# Patient Record
Sex: MALE | Race: BLACK OR AFRICAN AMERICAN | NOT HISPANIC OR LATINO | Employment: UNEMPLOYED
[De-identification: names, ages, dates, MRNs, and addresses within clinical notes are randomized per-mention and may not be internally consistent; named-entity substitution may affect disease eponyms.]

---
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
## 2024-07-27 ENCOUNTER — HOSPITAL ENCOUNTER (EMERGENCY)
Facility: CLINIC | Age: 19
Discharge: HOME | End: 2024-07-27
Attending: EMERGENCY MEDICINE | Admitting: EMERGENCY MEDICINE

## 2024-07-27 ENCOUNTER — APPOINTMENT (OUTPATIENT)
Dept: GENERAL RADIOLOGY | Facility: CLINIC | Age: 19
End: 2024-07-27

## 2024-07-27 VITALS
WEIGHT: 95.9 LBS | RESPIRATION RATE: 20 BRPM | SYSTOLIC BLOOD PRESSURE: 111 MMHG | DIASTOLIC BLOOD PRESSURE: 67 MMHG | OXYGEN SATURATION: 98 % | TEMPERATURE: 98.6 F | HEART RATE: 86 BPM

## 2024-07-27 DIAGNOSIS — R07.89 ATYPICAL CHEST PAIN: ICD-10-CM

## 2024-07-27 DIAGNOSIS — R06.02 SHORTNESS OF BREATH: ICD-10-CM

## 2024-07-27 LAB
ANION GAP SERPL CALCULATED.3IONS-SCNC: 16 MMOL/L (ref 7–15)
BASOPHILS # BLD AUTO: 0 10E3/UL (ref 0–0.2)
BASOPHILS NFR BLD AUTO: 1 %
BUN SERPL-MCNC: 6.3 MG/DL (ref 6–20)
CALCIUM SERPL-MCNC: 9.9 MG/DL (ref 8.8–10.4)
CHLORIDE SERPL-SCNC: 102 MMOL/L (ref 98–107)
CREAT SERPL-MCNC: 0.85 MG/DL (ref 0.67–1.17)
D DIMER PPP FEU-MCNC: <0.27 UG/ML FEU (ref 0–0.5)
EGFRCR SERPLBLD CKD-EPI 2021: >90 ML/MIN/1.73M2
EOSINOPHIL # BLD AUTO: 1.4 10E3/UL (ref 0–0.7)
EOSINOPHIL NFR BLD AUTO: 15 %
ERYTHROCYTE [DISTWIDTH] IN BLOOD BY AUTOMATED COUNT: 12 % (ref 10–15)
FLUAV RNA SPEC QL NAA+PROBE: NEGATIVE
FLUBV RNA RESP QL NAA+PROBE: NEGATIVE
GLUCOSE SERPL-MCNC: 85 MG/DL (ref 70–99)
HCO3 SERPL-SCNC: 20 MMOL/L (ref 22–29)
HCT VFR BLD AUTO: 48.3 % (ref 40–53)
HGB BLD-MCNC: 16.4 G/DL (ref 13.3–17.7)
HOLD SPECIMEN: NORMAL
HOLD SPECIMEN: NORMAL
IMM GRANULOCYTES # BLD: 0 10E3/UL
IMM GRANULOCYTES NFR BLD: 0 %
LYMPHOCYTES # BLD AUTO: 2.9 10E3/UL (ref 0.8–5.3)
LYMPHOCYTES NFR BLD AUTO: 32 %
MCH RBC QN AUTO: 29.1 PG (ref 26.5–33)
MCHC RBC AUTO-ENTMCNC: 34 G/DL (ref 31.5–36.5)
MCV RBC AUTO: 86 FL (ref 78–100)
MONOCYTES # BLD AUTO: 0.5 10E3/UL (ref 0–1.3)
MONOCYTES NFR BLD AUTO: 6 %
NEUTROPHILS # BLD AUTO: 4.1 10E3/UL (ref 1.6–8.3)
NEUTROPHILS NFR BLD AUTO: 46 %
NRBC # BLD AUTO: 0 10E3/UL
NRBC BLD AUTO-RTO: 0 /100
PLATELET # BLD AUTO: 239 10E3/UL (ref 150–450)
POTASSIUM SERPL-SCNC: 3.7 MMOL/L (ref 3.4–5.3)
RBC # BLD AUTO: 5.63 10E6/UL (ref 4.4–5.9)
RSV RNA SPEC NAA+PROBE: NEGATIVE
SARS-COV-2 RNA RESP QL NAA+PROBE: NEGATIVE
SODIUM SERPL-SCNC: 138 MMOL/L (ref 135–145)
TROPONIN T SERPL HS-MCNC: <6 NG/L
WBC # BLD AUTO: 8.9 10E3/UL (ref 4–11)

## 2024-07-27 PROCEDURE — 85004 AUTOMATED DIFF WBC COUNT: CPT | Performed by: EMERGENCY MEDICINE

## 2024-07-27 PROCEDURE — 87637 SARSCOV2&INF A&B&RSV AMP PRB: CPT | Performed by: EMERGENCY MEDICINE

## 2024-07-27 PROCEDURE — 99285 EMERGENCY DEPT VISIT HI MDM: CPT | Mod: 25

## 2024-07-27 PROCEDURE — 96372 THER/PROPH/DIAG INJ SC/IM: CPT

## 2024-07-27 PROCEDURE — 36415 COLL VENOUS BLD VENIPUNCTURE: CPT | Performed by: EMERGENCY MEDICINE

## 2024-07-27 PROCEDURE — 71046 X-RAY EXAM CHEST 2 VIEWS: CPT

## 2024-07-27 PROCEDURE — 93005 ELECTROCARDIOGRAM TRACING: CPT

## 2024-07-27 PROCEDURE — 84484 ASSAY OF TROPONIN QUANT: CPT | Performed by: EMERGENCY MEDICINE

## 2024-07-27 PROCEDURE — 250N000009 HC RX 250

## 2024-07-27 PROCEDURE — 94640 AIRWAY INHALATION TREATMENT: CPT

## 2024-07-27 PROCEDURE — 85025 COMPLETE CBC W/AUTO DIFF WBC: CPT | Performed by: EMERGENCY MEDICINE

## 2024-07-27 PROCEDURE — 85379 FIBRIN DEGRADATION QUANT: CPT | Performed by: EMERGENCY MEDICINE

## 2024-07-27 PROCEDURE — 250N000011 HC RX IP 250 OP 636

## 2024-07-27 PROCEDURE — 82374 ASSAY BLOOD CARBON DIOXIDE: CPT | Performed by: EMERGENCY MEDICINE

## 2024-07-27 RX ORDER — ALBUTEROL SULFATE 0.83 MG/ML
2.5 SOLUTION RESPIRATORY (INHALATION)
Status: COMPLETED | OUTPATIENT
Start: 2024-07-27 | End: 2024-07-27

## 2024-07-27 RX ORDER — ALBUTEROL SULFATE 90 UG/1
2 AEROSOL, METERED RESPIRATORY (INHALATION) EVERY 6 HOURS PRN
Qty: 18 G | Refills: 0 | Status: SHIPPED | OUTPATIENT
Start: 2024-07-27

## 2024-07-27 RX ORDER — PREDNISONE 20 MG/1
TABLET ORAL
Qty: 10 TABLET | Refills: 0 | Status: SHIPPED | OUTPATIENT
Start: 2024-07-27

## 2024-07-27 RX ORDER — KETOROLAC TROMETHAMINE 15 MG/ML
10 INJECTION, SOLUTION INTRAMUSCULAR; INTRAVENOUS ONCE
Status: COMPLETED | OUTPATIENT
Start: 2024-07-27 | End: 2024-07-27

## 2024-07-27 RX ORDER — KETOROLAC TROMETHAMINE 15 MG/ML
10 INJECTION, SOLUTION INTRAMUSCULAR; INTRAVENOUS ONCE
Status: DISCONTINUED | OUTPATIENT
Start: 2024-07-27 | End: 2024-07-27

## 2024-07-27 RX ORDER — IPRATROPIUM BROMIDE AND ALBUTEROL SULFATE 2.5; .5 MG/3ML; MG/3ML
1 SOLUTION RESPIRATORY (INHALATION) EVERY 6 HOURS PRN
Qty: 90 ML | Refills: 0 | Status: SHIPPED | OUTPATIENT
Start: 2024-07-27

## 2024-07-27 RX ADMIN — KETOROLAC TROMETHAMINE 10 MG: 15 INJECTION, SOLUTION INTRAMUSCULAR; INTRAVENOUS at 18:36

## 2024-07-27 RX ADMIN — ALBUTEROL SULFATE 2.5 MG: 2.5 SOLUTION RESPIRATORY (INHALATION) at 18:35

## 2024-07-27 ASSESSMENT — COLUMBIA-SUICIDE SEVERITY RATING SCALE - C-SSRS
6. HAVE YOU EVER DONE ANYTHING, STARTED TO DO ANYTHING, OR PREPARED TO DO ANYTHING TO END YOUR LIFE?: NO
1. IN THE PAST MONTH, HAVE YOU WISHED YOU WERE DEAD OR WISHED YOU COULD GO TO SLEEP AND NOT WAKE UP?: NO
2. HAVE YOU ACTUALLY HAD ANY THOUGHTS OF KILLING YOURSELF IN THE PAST MONTH?: NO

## 2024-07-27 ASSESSMENT — ACTIVITIES OF DAILY LIVING (ADL)
ADLS_ACUITY_SCORE: 33
ADLS_ACUITY_SCORE: 35

## 2024-07-27 NOTE — ED PROVIDER NOTES
Emergency Department Note      History of Present Illness     Chief Complaint   Shortness of Breath      HPI   Zarina Bowling is a 18 year old male with a past medical history of asthma who presents to the emergency department today for evaluation of chest pain and shortness of breath.  Patient reports that he woke up this morning with centralized chest pain rated 10/10 in severity.  It radiates to his right shoulder.  It is pleuritic in nature.  States that he is never felt pain like this before.  Does report that he has been having an ongoing cough for about 2 months now.  Has tried Mucinex without relief.  Does have a history of asthma however he ran out of his inhalers in November.  Does report recent travel from [place] 2 days ago.  Denies fever, chills, nausea, vomiting, abdominal pain.    Independent Historian   None    Review of External Notes   None    Past Medical History     Medical History and Problem List   Past Medical History:   Diagnosis Date    ROUTINE CHILD HEALTH EXAM        Medications   albuterol (PROAIR HFA/PROVENTIL HFA/VENTOLIN HFA) 108 (90 Base) MCG/ACT inhaler  ipratropium - albuterol 0.5 mg/2.5 mg/3 mL (DUONEB) 0.5-2.5 (3) MG/3ML neb solution  predniSONE (DELTASONE) 20 MG tablet  HYDROCORTISONE (TOPICAL) 1 % EX CREA  PEDIACARE INFANT DECON/COUGH 7.5-2.5 MG/0.8ML OR SOLN  TYLENOL INFANT DROPS SOLN 100 MG/ML OR        Surgical History   No past surgical history on file.    Physical Exam     Patient Vitals for the past 24 hrs:   BP Temp Pulse Resp SpO2 Weight   07/27/24 1728 -- -- -- -- -- 43.5 kg (95 lb 14.4 oz)   07/27/24 1727 111/67 -- -- -- -- --   07/27/24 1724 -- 98.6  F (37  C) 86 20 98 % --     Physical Exam  General: Awake, alert, non-toxic.  Head:  Scalp is atraumatic.   Eyes:  Conjunctiva normal, PERRL  ENT:  The external nose and ears are normal.     Oropharynx clear, uvula midline.  Neck:  Normal range of motion without rigidity.  CV:  Regular rate and rhythm    No pathologic  murmur, rubs, or gallops.  Resp:  Breath sounds are clear bilaterally    Non-labored, no retractions or accessory muscle use  Abdomen: Abdomen is soft, no distension, no tenderness, no masses. No CVA tenderness.  MS:  No lower extremity edema/swelling. No midline cervical, thoracic, or lumbar tenderness.  Extremities without joint swelling or redness.  Skin:  Warm and dry, No rash or lesions noted.  Neuro:  Alert and oriented.  GCS 15 Moves all extremities normal.  No facial asymmetry. Gait normal.  Psych: Awake. Alert. Normal affect. Appropriate interactions.    Diagnostics     Lab Results   Labs Ordered and Resulted from Time of ED Arrival to Time of ED Departure   BASIC METABOLIC PANEL - Abnormal       Result Value    Sodium 138      Potassium 3.7      Chloride 102      Carbon Dioxide (CO2) 20 (*)     Anion Gap 16 (*)     Urea Nitrogen 6.3      Creatinine 0.85      GFR Estimate >90      Calcium 9.9      Glucose 85     CBC WITH PLATELETS AND DIFFERENTIAL - Abnormal    WBC Count 8.9      RBC Count 5.63      Hemoglobin 16.4      Hematocrit 48.3      MCV 86      MCH 29.1      MCHC 34.0      RDW 12.0      Platelet Count 239      % Neutrophils 46      % Lymphocytes 32      % Monocytes 6      % Eosinophils 15      % Basophils 1      % Immature Granulocytes 0      NRBCs per 100 WBC 0      Absolute Neutrophils 4.1      Absolute Lymphocytes 2.9      Absolute Monocytes 0.5      Absolute Eosinophils 1.4 (*)     Absolute Basophils 0.0      Absolute Immature Granulocytes 0.0      Absolute NRBCs 0.0     TROPONIN T, HIGH SENSITIVITY - Normal    Troponin T, High Sensitivity <6     INFLUENZA A/B, RSV, & SARS-COV2 PCR - Normal    Influenza A PCR Negative      Influenza B PCR Negative      RSV PCR Negative      SARS CoV2 PCR Negative     D DIMER QUANTITATIVE - Normal    D-Dimer Quantitative <0.27         Imaging   XR Chest 2 Views   Final Result   IMPRESSION: Hyperaeration.  No focal consolidation  No pneumothorax or pleural  effusion.  Cardiomediastinal within normal limits.          ECG results from 07/27/24   EKG 12 lead     Value    Systolic Blood Pressure     Diastolic Blood Pressure     Ventricular Rate 58    Atrial Rate 58    MN Interval 120    QRS Duration 88        QTc 378    P Axis 38    R AXIS 87    T Axis 72    Interpretation ECG      Sinus bradycardia with sinus arrhythmia  Otherwise normal ECG  No previous ECGs available       Independent Interpretation   None    ED Course      Medications Administered   Medications   albuterol (PROVENTIL) neb solution 2.5 mg (2.5 mg Nebulization $Given 7/27/24 9825)   ketorolac (TORADOL) injection 10 mg (10 mg Intramuscular $Given 7/27/24 2969)       Procedures   Procedures     Discussion of Management   None    ED Course        Optional/Additional Documentation  None    Medical Decision Making / Diagnosis     CMS Diagnoses: None    MIPS       None    MDM   Zarina Bowling is a 18 year old male who presented to the emergency department today for evaluation chest pain and shortness of breath.  See HPI for further details.  Patient is afebrile and hemodynamically stable.  There is no wheezing on auscultation however due to history of asthma given albuterol nebulizer here. She due to the nature of the pleuritic chest pain and recent travel D-dimer was obtained which was thankfully negative.  Troponin negative.  EKG in normal sinus rhythm without acute ST/T wave changes. Low suspicion for ACS.  Chest x-ray without evidence of pneumonia, pneumothorax, pleural effusion.  No evidence patient felt improved with above treatments.  COVID/influenza negative. Patient at this time no longer has albuterol inhalers at home.  Patient patient discharged home with albuterol inhalers along with DuoNeb nebulizer solution.  RT teaching was performed.  Due to the length of the patient's cough for a prednisone burst was ordered.  All questions answered.  Patient discharged home in stable  condition.    Disposition   The patient was discharged.     Diagnosis     ICD-10-CM    1. Shortness of breath  R06.02 Nebulizer and Supplies Order      2. Atypical chest pain  R07.89            Discharge Medications   Discharge Medication List as of 7/27/2024  7:45 PM        START taking these medications    Details   albuterol (PROAIR HFA/PROVENTIL HFA/VENTOLIN HFA) 108 (90 Base) MCG/ACT inhaler Inhale 2 puffs into the lungs every 6 hours as needed for shortness of breath, wheezing or cough, Disp-18 g, R-0, Local Print[facility]armacy may dispense brand covered by insurance (Proair, or proventil or ventolin or generic albuterol inhaler)      ipratropium - albuterol 0.5 mg/2.5 mg/3 mL (DUONEB) 0.5-2.5 (3) MG/3ML neb solution Take 1 vial (3 mLs) by nebulization every 6 hours as needed for shortness of breath, wheezing or cough, Disp-90 mL, R-0, Local Print      predniSONE (DELTASONE) 20 MG tablet Take two tablets (= 40mg) each day for 5 (five) days, Disp-10 tablet, R-0, Local Print               ELVA Hess Alexandra, PA-C  07/28/24 0114

## 2024-07-27 NOTE — ED TRIAGE NOTES
Patient arrives with complaints of SOB and chest tightness for the last week. Hx of asthma. Does not have inhaler or nebulizer solutions. ABCs intact.

## 2024-07-28 NOTE — ED PROVIDER NOTES
ED APC SUPERVISION NOTE:   I evaluated this patient in conjunction with Elizabeth Brennan PA-C  I have participated in the care of the patient and personally performed key elements of the history, exam, and medical decision making.      HPI:   Zarina Bowling is a 18 year old male with a history of asthma who presents to the ED for evaluation of chest pain and shortness of breath. The patient reports that he woke up this morning with chest pain that radiated to his right shoulder. Alongside this, he has had a productive cough for the last 2 weeks, but he stopped coughing today. He denies having a fever, runny nose, abdominal pain, nausea, or vomiting. No family history early [place]. He flew recently from [place]. No history blood clots/drug use/sick contacts.      Independent Historian:   None    Review of External Notes: none      EXAM:   Nursing note and vitals reviewed.  Constitutional: Well nourished. Resting comfortably.   Eyes: Conjunctiva normal.  Pupils are equal, round, and reactive to light.   ENT: Nose normal. Mucous membranes pink and moist.    Neck: Normal range of motion.  CVS: Normal rate, regular rhythm.  Normal heart sounds.  No murmur.  Pulmonary: Lungs clear to auscultation bilaterally. No wheezes/rales/rhonchi.  GI: Abdomen soft. Nontender, nondistended. No rigidity or guarding.    MSK: No calf tenderness or swelling.  Neuro: Alert. Follows simple commands.  Skin: Skin is warm and dry. No rash noted.   Psychiatric: Normal affect.       Independent Interpretation (X-rays, CTs, rhythm strip):  CXR: no focal pneumonia    Consultations/Discussion of Management or Tests:  None     Social Determinants of Health affecting care:   None     MEDICAL DECISION MAKING/ASSESSMENT AND PLAN:   Patient is a 18-year-old male presenting with predominant complaints of chest pain and shortness of breath as well as recent cough.  He is nontoxic, in no significant distress on exam.  He was given albuterol prior to my  evaluation though reportedly had diminished lung sounds.  He reported significant symptom improvement after albuterol and Toradol.  EKG without focal ischemia, Brugada, WPW or prolonged QTc.  Delta troponin negative, his presentation would be atypical of ACS.  D-dimer negative, clinically doubt PE.  Chest x-ray without focal pneumonia, fluid overload, widened mediastinum or pneumothorax.  He has no abdominal tenderness and I doubt intra-abdominal catastrophe.  I did discuss with patient quite possible his chest pain is more pleuritic in nature and he is suffering from a mild asthma exacerbation.  Will continue albuterol on discharge as well as short course of prednisone given his reported recent cough.  COVID-19/influenza/RSV pending at time of discharge.  Nebulizer also provided for home.  Return precautions given.     DIAGNOSIS:     ICD-10-CM    1. Shortness of breath  R06.02 Nebulizer and Supplies Order      2. Atypical chest pain  R07.89                DISPOSITION:   Discharge     Naya Mason DO  7/27/2024  [facility] EMERGENCY DEPT     Naya Mason, DO  07/27/24 1953     192

## 2024-07-28 NOTE — DISCHARGE INSTRUCTIONS
Please make an appointment to follow up with you primary care provider.     Use albuterol nebulizer's as needed for shortness of breath and wheezing.    Please take prednisone 40 mg for the next 5 days to help with cough and inflammation.    Return to the emergency department if you develop fever, worsening shortness of breath, worsening chest pain.

## 2024-07-29 LAB
ATRIAL RATE - MUSE: 58 BPM
DIASTOLIC BLOOD PRESSURE - MUSE: NORMAL MMHG
INTERPRETATION ECG - MUSE: NORMAL
P AXIS - MUSE: 38 DEGREES
PR INTERVAL - MUSE: 120 MS
QRS DURATION - MUSE: 88 MS
QT - MUSE: 386 MS
QTC - MUSE: 378 MS
R AXIS - MUSE: 87 DEGREES
SYSTOLIC BLOOD PRESSURE - MUSE: NORMAL MMHG
T AXIS - MUSE: 72 DEGREES
VENTRICULAR RATE- MUSE: 58 BPM

## 2024-11-11 ENCOUNTER — HOSPITAL ENCOUNTER (EMERGENCY)
Facility: CLINIC | Age: 19
Discharge: HOME | End: 2024-11-11
Attending: STUDENT IN AN ORGANIZED HEALTH CARE EDUCATION/TRAINING PROGRAM | Admitting: STUDENT IN AN ORGANIZED HEALTH CARE EDUCATION/TRAINING PROGRAM

## 2024-11-11 VITALS
SYSTOLIC BLOOD PRESSURE: 119 MMHG | HEIGHT: 67 IN | OXYGEN SATURATION: 99 % | HEART RATE: 68 BPM | TEMPERATURE: 99.9 F | RESPIRATION RATE: 16 BRPM | WEIGHT: 110 LBS | BODY MASS INDEX: 17.27 KG/M2 | DIASTOLIC BLOOD PRESSURE: 83 MMHG

## 2024-11-11 DIAGNOSIS — H61.22 IMPACTED CERUMEN OF LEFT EAR: ICD-10-CM

## 2024-11-11 DIAGNOSIS — J06.9 URI (UPPER RESPIRATORY INFECTION): ICD-10-CM

## 2024-11-11 LAB
FLUAV RNA SPEC QL NAA+PROBE: NEGATIVE
FLUBV RNA RESP QL NAA+PROBE: NEGATIVE
GROUP A STREP BY PCR: NOT DETECTED
RSV RNA SPEC NAA+PROBE: NEGATIVE
SARS-COV-2 RNA RESP QL NAA+PROBE: NEGATIVE

## 2024-11-11 PROCEDURE — 87637 SARSCOV2&INF A&B&RSV AMP PRB: CPT | Performed by: STUDENT IN AN ORGANIZED HEALTH CARE EDUCATION/TRAINING PROGRAM

## 2024-11-11 PROCEDURE — 69209 REMOVE IMPACTED EAR WAX UNI: CPT | Mod: LT

## 2024-11-11 PROCEDURE — 87651 STREP A DNA AMP PROBE: CPT | Performed by: STUDENT IN AN ORGANIZED HEALTH CARE EDUCATION/TRAINING PROGRAM

## 2024-11-11 PROCEDURE — 99284 EMERGENCY DEPT VISIT MOD MDM: CPT | Mod: 25

## 2024-11-11 RX ORDER — FLUTICASONE PROPIONATE 50 MCG
1 SPRAY, SUSPENSION (ML) NASAL DAILY
Qty: 15.8 ML | Refills: 0 | Status: SHIPPED | OUTPATIENT
Start: 2024-11-11

## 2024-11-11 ASSESSMENT — ACTIVITIES OF DAILY LIVING (ADL)
ADLS_ACUITY_SCORE: 0
ADLS_ACUITY_SCORE: 0

## 2024-11-11 ASSESSMENT — COLUMBIA-SUICIDE SEVERITY RATING SCALE - C-SSRS
2. HAVE YOU ACTUALLY HAD ANY THOUGHTS OF KILLING YOURSELF IN THE PAST MONTH?: NO
1. IN THE PAST MONTH, HAVE YOU WISHED YOU WERE DEAD OR WISHED YOU COULD GO TO SLEEP AND NOT WAKE UP?: NO
6. HAVE YOU EVER DONE ANYTHING, STARTED TO DO ANYTHING, OR PREPARED TO DO ANYTHING TO END YOUR LIFE?: NO

## 2024-11-12 NOTE — DISCHARGE INSTRUCTIONS
Earwax drops as well as nasal spray have been sent to pharmacy, please take as prescribed.  I suspect you likely have a viral cause of your ongoing cold.  Continue to get plenty of rest and hydration and take ibuprofen and Tylenol as needed.  You can also look into over-the-counter decongestants to help with ongoing congestion.

## 2024-11-12 NOTE — ED PROVIDER NOTES
"  Emergency Department Note      History of Present Illness     Chief Complaint   Flu Symptoms      HPI   Zarina Bowling is a 18 year old male who presents with URI symptoms and left ear pain.  Patient states that yesterday he had a cough and sore throat but states that the symptoms have resolved.  Today, developed left ear pain along with congestion.  Denies any pain to the right ear.  Denies any fever, nausea, vomiting.  No known sick contacts.      Independent Historian   None    Review of External Notes   None    Past Medical History     Medical History and Problem List   Past Medical History:   Diagnosis Date    ROUTINE CHILD HEALTH EXAM        Medications   carbamide peroxide (DEBROX) 6.5 % otic solution  fluticasone (FLONASE) 50 MCG/ACT nasal spray  albuterol (PROAIR HFA/PROVENTIL HFA/VENTOLIN HFA) 108 (90 Base) MCG/ACT inhaler  HYDROCORTISONE (TOPICAL) 1 % EX CREA  ipratropium - albuterol 0.5 mg/2.5 mg/3 mL (DUONEB) 0.5-2.5 (3) MG/3ML neb solution  PEDIACARE INFANT DECON/COUGH 7.5-2.5 MG/0.8ML OR SOLN  predniSONE (DELTASONE) 20 MG tablet  TYLENOL INFANT DROPS SOLN 100 MG/ML OR        Surgical History   No past surgical history on file.    Physical Exam     Patient Vitals for the past 24 hrs:   BP Temp Pulse Resp SpO2 Height Weight   11/11/24 2139 131/75 -- -- -- -- -- --   11/11/24 2135 -- 99.9  F (37.7  C) 100 18 100 % 1.702 m (5' 7\") 49.9 kg (110 lb)     Physical Exam  General: Alert and cooperative with exam. Patient in no apparent distress. Normal mentation.  Head:  Scalp is NC/AT  Eyes:  No scleral icterus, PERRL  ENT:  The external nose and ears are normal. The oropharynx is normal and without erythema; mucus membranes are moist. Uvula midline, no evidence of deep space infection.  Left ear with cerumen impaction.  Right ear with effusion however no tympanic membrane erythema or bulging.  Neck:  Normal range of motion without rigidity.  CV:  Regular rate and rhythm    No pathologic murmur "   Resp:  Breath sounds are clear bilaterally    Non-labored, no retractions or accessory muscle use  GI:  Abdomen is soft, no distension, no tenderness. No peritoneal signs  MS:  No lower extremity edema   Skin:  Warm and dry, No rash or lesions noted.  Neuro:  Oriented x 3. No gross motor deficits.      Diagnostics     Lab Results   Labs Ordered and Resulted from Time of ED Arrival to Time of ED Departure   INFLUENZA A/B, RSV, & SARS-COV2 PCR - Normal       Result Value    Influenza A PCR Negative      Influenza B PCR Negative      RSV PCR Negative      SARS CoV2 PCR Negative     GROUP A STREPTOCOCCUS PCR THROAT SWAB - Normal    Group A strep by PCR Not Detected             ED Course      Medications Administered   Medications - No data to display    Procedures   Procedures     Discussion of Management   None    ED Course        Additional Documentation  None    Medical Decision Making / Diagnosis     CMS Diagnoses: None    MIPS       None    MDM   Zarina Bowling is a 18 year old male presents with congestion, previous cough and sore throat, along with left-sided ear pain.  On exam, left ear with cerumen impaction.  Right ear with effusion but no tympanic membrane bulging or erythema.  No evidence of mastoiditis on exam.  Posterior oropharynx is normal with no tonsillar swelling or exudates present.  Lungs are clear to auscultation.  He is vitally stable and afebrile here.  COVID, influenza, RSV, and strep testing was all obtained and negative.  Ear irrigation was completed at the bedside to the left ear with significant output however small amount of hard earwax remains present.  Patient tolerated this well and feels symptomatically improved.  Will start him on Debrox drops to help with ongoing earwax accumulation.  Will also send him home with Rx for Flonase for ear effusion symptoms.  I suspect he likely has viral URI contributing to his ongoing symptoms and encouraged him to get plenty of rest and hydration and  continue ibuprofen and Tylenol as needed.  Follow-up with primary care provider as needed, return to ER if symptoms worsen or cannot be controlled at home.    Disposition   The patient was discharged.     Diagnosis     ICD-10-CM    1. Impacted cerumen of left ear  H61.22       2. URI (upper respiratory infection)  J06.9            Discharge Medications   New Prescriptions    CARBAMIDE PEROXIDE (DEBROX) 6.5 % OTIC SOLUTION    Place 5 drops Into the left ear 2 times daily.    FLUTICASONE (FLONASE) 50 MCG/ACT NASAL SPRAY    Spray 1 spray into both nostrils daily.         ELVA Contreras Lauren R, PA-C  11/11/24 0280